# Patient Record
Sex: FEMALE | Race: BLACK OR AFRICAN AMERICAN | NOT HISPANIC OR LATINO | Employment: UNEMPLOYED | URBAN - METROPOLITAN AREA
[De-identification: names, ages, dates, MRNs, and addresses within clinical notes are randomized per-mention and may not be internally consistent; named-entity substitution may affect disease eponyms.]

---

## 2019-04-08 ENCOUNTER — HOSPITAL ENCOUNTER (EMERGENCY)
Facility: OTHER | Age: 24
Discharge: HOME | End: 2019-04-08
Attending: EMERGENCY MEDICINE

## 2019-04-08 VITALS
DIASTOLIC BLOOD PRESSURE: 71 MMHG | SYSTOLIC BLOOD PRESSURE: 108 MMHG | WEIGHT: 120 LBS | BODY MASS INDEX: 24.19 KG/M2 | HEART RATE: 75 BPM | OXYGEN SATURATION: 100 % | TEMPERATURE: 99 F | HEIGHT: 59 IN | RESPIRATION RATE: 17 BRPM

## 2019-04-08 DIAGNOSIS — N30.91 HEMORRHAGIC CYSTITIS: Primary | ICD-10-CM

## 2019-04-08 LAB
B-HCG UR QL: NEGATIVE
BACTERIA #/AREA URNS HPF: ABNORMAL /HPF
BILIRUB UR QL STRIP: NEGATIVE
CLARITY UR: ABNORMAL
COLOR UR: YELLOW
CTP QC/QA: YES
GLUCOSE UR QL STRIP: NEGATIVE
HGB UR QL STRIP: ABNORMAL
KETONES UR QL STRIP: NEGATIVE
LEUKOCYTE ESTERASE UR QL STRIP: ABNORMAL
MICROSCOPIC COMMENT: ABNORMAL
NITRITE UR QL STRIP: POSITIVE
PH UR STRIP: 7 [PH] (ref 5–8)
PROT UR QL STRIP: NEGATIVE
RBC #/AREA URNS HPF: 1 /HPF (ref 0–4)
SP GR UR STRIP: 1.02 (ref 1–1.03)
SQUAMOUS #/AREA URNS HPF: 12 /HPF
URN SPEC COLLECT METH UR: ABNORMAL
UROBILINOGEN UR STRIP-ACNC: 1 EU/DL
WBC #/AREA URNS HPF: >100 /HPF (ref 0–5)
WBC CLUMPS URNS QL MICRO: ABNORMAL

## 2019-04-08 PROCEDURE — 81000 URINALYSIS NONAUTO W/SCOPE: CPT

## 2019-04-08 PROCEDURE — 87086 URINE CULTURE/COLONY COUNT: CPT

## 2019-04-08 PROCEDURE — 81025 URINE PREGNANCY TEST: CPT | Performed by: EMERGENCY MEDICINE

## 2019-04-08 PROCEDURE — 25000003 PHARM REV CODE 250: Performed by: PHYSICIAN ASSISTANT

## 2019-04-08 PROCEDURE — 99283 EMERGENCY DEPT VISIT LOW MDM: CPT | Mod: 25

## 2019-04-08 RX ORDER — KETOROLAC TROMETHAMINE 10 MG/1
10 TABLET, FILM COATED ORAL
Status: COMPLETED | OUTPATIENT
Start: 2019-04-08 | End: 2019-04-08

## 2019-04-08 RX ORDER — CIPROFLOXACIN 500 MG/1
500 TABLET ORAL 2 TIMES DAILY
Qty: 14 TABLET | Refills: 0 | Status: SHIPPED | OUTPATIENT
Start: 2019-04-08 | End: 2019-04-15

## 2019-04-08 RX ADMIN — KETOROLAC TROMETHAMINE 10 MG: 10 TABLET, FILM COATED ORAL at 05:04

## 2019-04-08 NOTE — ED PROVIDER NOTES
Encounter Date: 4/8/2019       History     Chief Complaint   Patient presents with    Dysuria     frequency and small amounts of blood in urine.  States the last time, it was a kidney stone.  Symptoms have been present for 2 weeks.      Patient is a 23-year-old female with no pertinent past medical history presents to the ED with abdominal pain. Patient reports left lower quadrant pain that radiates to her bilateral lower back.  She denies injury. She also reports malodorous urine and frequency. She states she noticed a small amount of blood when wiping  today.  Her last menstrual cycle was 3 weeks ago.  She denies fever, nausea, or emesis.  She denies vaginal discharge. She has not taken any medications for her symptoms.    The history is provided by the patient.     Review of patient's allergies indicates:  No Known Allergies  No past medical history on file.  Past Surgical History:   Procedure Laterality Date    none       No family history on file.  Social History     Tobacco Use    Smoking status: Current Every Day Smoker   Substance Use Topics    Alcohol use: No    Drug use: Yes     Types: Marijuana     Review of Systems   Constitutional: Negative for chills and fever.   HENT: Negative for congestion and sore throat.    Eyes: Negative for pain.   Respiratory: Negative for shortness of breath.    Cardiovascular: Negative for chest pain.   Gastrointestinal: Negative for abdominal pain, diarrhea, nausea and vomiting.   Genitourinary: Positive for dysuria and hematuria.   Musculoskeletal: Negative for arthralgias.   Skin: Negative for rash.   Neurological: Negative for headaches.       Physical Exam     Initial Vitals [04/08/19 1617]   BP Pulse Resp Temp SpO2   126/77 95 17 98.8 °F (37.1 °C) 100 %      MAP       --         Physical Exam    Constitutional: Vital signs are normal. She is cooperative.  Non-toxic appearance. No distress.   HENT:   Head: Normocephalic and atraumatic.   Eyes: EOM are normal.  Pupils are equal, round, and reactive to light.   Neck: Normal range of motion. Neck supple.   Cardiovascular: Normal rate, regular rhythm and intact distal pulses.   Pulmonary/Chest: Breath sounds normal. She has no wheezes. She has no rhonchi. She has no rales.   Abdominal: Soft. Bowel sounds are normal. There is tenderness (Mild suprapubic and left lower quadrant.  No CVA tenderness.).   Musculoskeletal: Normal range of motion. She exhibits no edema.   Neurological: She is alert and oriented to person, place, and time. GCS eye subscore is 4. GCS verbal subscore is 5. GCS motor subscore is 6.   Skin: Skin is warm and dry. No rash noted.         ED Course   Procedures  Labs Reviewed   URINALYSIS, REFLEX TO URINE CULTURE - Abnormal; Notable for the following components:       Result Value    Appearance, UA Hazy (*)     Occult Blood UA Trace (*)     Nitrite, UA Positive (*)     Leukocytes, UA 2+ (*)     All other components within normal limits    Narrative:     Preferred Collection Type->Urine, Clean Catch   URINALYSIS MICROSCOPIC - Abnormal; Notable for the following components:    WBC, UA >100 (*)     WBC Clumps, UA Occasional (*)     Bacteria, UA Many (*)     All other components within normal limits    Narrative:     Preferred Collection Type->Urine, Clean Catch   CULTURE, URINE   POCT URINE PREGNANCY          Imaging Results    None          Medical Decision Making:   Initial Assessment:   Urgent evaluation of a 23 y.o. female presenting to the emergency department complaining of malodorous urine, frequency, and lower back pain. Patient is afebrile, nontoxic appearing and hemodynamically stable.  Patient appears well on exam.  She does not have true CVA tenderness.  She has mild suprapubic and left lower quadrant tenderness. No signs of acute abdomen.  Symptoms are consistent cystitis.  Will obtain urinalysis.  ED Management:  UPT is negative.  Urinalysis reveals 2+ leukocytes, positive nitrite with greater  than 100 WBCs, occasional WBC clumps and many bacteria.  Patient will be treated with Cipro.  Urine culture sent.  She was advised on symptomatic care.  She is given strict return precautions.  She has no other complaints at this time is stable for discharge.                      Clinical Impression:     1. Hemorrhagic cystitis                               David Sotelo PA-C  04/08/19 1825

## 2019-04-08 NOTE — ED NOTES
"Pt to ED reporting LLQ + pelvic pain x several days, stating "when I wiped I saw blood but only when I wiped" x several days ago as well. Also rpeoritng her urine is malodorous. Pt denies recent fever, chills, nausea or vomiting. Pt AAOx4 and appropriate at this time. Respirations even and unlabored. No acute distress noted.    "

## 2019-04-08 NOTE — ED NOTES
Appearance: Pt awake, alert & oriented to person, place & time. Pt in no acute distress at present time. Pt is clean and well groomed with clothes appropriately fastened.   Skin: Skin warm, dry & intact. Color consistent with ethnicity. Mucous membranes moist. No breakdown or brusing noted.   Musculoskeletal: Patient moving all extremities well, no obvious swelling or deformities noted.   Respiratory: Respirations spontaneous, even, and non-labored. Visible chest rise noted. Airway is open and patent. No accessory muscle use noted.   Neurologic: Sensation is intact. Speech is clear and appropriate. Eyes open spontaneously, behavior appropriate to situation, follows commands,  purposeful motor response noted.   Abdomen: No N/V/D at this time.   : SEE HPI.

## 2019-04-09 LAB
BACTERIA UR CULT: NORMAL
BACTERIA UR CULT: NORMAL

## 2022-02-24 ENCOUNTER — HOSPITAL ENCOUNTER (EMERGENCY)
Facility: OTHER | Age: 27
Discharge: HOME | End: 2022-02-24
Attending: EMERGENCY MEDICINE
Payer: MEDICAID

## 2022-02-24 VITALS
SYSTOLIC BLOOD PRESSURE: 114 MMHG | BODY MASS INDEX: 26.26 KG/M2 | RESPIRATION RATE: 18 BRPM | OXYGEN SATURATION: 100 % | HEART RATE: 100 BPM | TEMPERATURE: 98 F | WEIGHT: 130 LBS | DIASTOLIC BLOOD PRESSURE: 78 MMHG

## 2022-02-24 DIAGNOSIS — R35.0 URINARY FREQUENCY: Primary | ICD-10-CM

## 2022-02-24 DIAGNOSIS — N30.90 CYSTITIS: ICD-10-CM

## 2022-02-24 LAB
B-HCG UR QL: NEGATIVE
BACTERIA #/AREA URNS HPF: ABNORMAL /HPF
BILIRUB UR QL STRIP: NEGATIVE
CLARITY UR: ABNORMAL
COLOR UR: YELLOW
CTP QC/QA: YES
GLUCOSE UR QL STRIP: NEGATIVE
HGB UR QL STRIP: ABNORMAL
KETONES UR QL STRIP: NEGATIVE
LEUKOCYTE ESTERASE UR QL STRIP: ABNORMAL
MICROSCOPIC COMMENT: ABNORMAL
NITRITE UR QL STRIP: POSITIVE
PH UR STRIP: 6 [PH] (ref 5–8)
PROT UR QL STRIP: NEGATIVE
RBC #/AREA URNS HPF: 1 /HPF (ref 0–4)
SP GR UR STRIP: 1.02 (ref 1–1.03)
URN SPEC COLLECT METH UR: ABNORMAL
UROBILINOGEN UR STRIP-ACNC: 1 EU/DL
WBC #/AREA URNS HPF: 100 /HPF (ref 0–5)

## 2022-02-24 PROCEDURE — 87086 URINE CULTURE/COLONY COUNT: CPT | Performed by: PHYSICIAN ASSISTANT

## 2022-02-24 PROCEDURE — 87088 URINE BACTERIA CULTURE: CPT | Performed by: PHYSICIAN ASSISTANT

## 2022-02-24 PROCEDURE — 87077 CULTURE AEROBIC IDENTIFY: CPT | Performed by: PHYSICIAN ASSISTANT

## 2022-02-24 PROCEDURE — 87186 SC STD MICRODIL/AGAR DIL: CPT | Performed by: PHYSICIAN ASSISTANT

## 2022-02-24 PROCEDURE — 81000 URINALYSIS NONAUTO W/SCOPE: CPT | Performed by: PHYSICIAN ASSISTANT

## 2022-02-24 PROCEDURE — 81025 URINE PREGNANCY TEST: CPT | Performed by: PHYSICIAN ASSISTANT

## 2022-02-24 PROCEDURE — 25000003 PHARM REV CODE 250: Performed by: PHYSICIAN ASSISTANT

## 2022-02-24 PROCEDURE — 99284 EMERGENCY DEPT VISIT MOD MDM: CPT | Mod: 25

## 2022-02-24 PROCEDURE — 96372 THER/PROPH/DIAG INJ SC/IM: CPT

## 2022-02-24 PROCEDURE — 63600175 PHARM REV CODE 636 W HCPCS: Performed by: EMERGENCY MEDICINE

## 2022-02-24 RX ORDER — CEFTRIAXONE 1 G/1
1 INJECTION, POWDER, FOR SOLUTION INTRAMUSCULAR; INTRAVENOUS
Status: COMPLETED | OUTPATIENT
Start: 2022-02-24 | End: 2022-02-24

## 2022-02-24 RX ORDER — CIPROFLOXACIN 500 MG/1
500 TABLET ORAL 2 TIMES DAILY
Qty: 14 TABLET | Refills: 0 | Status: SHIPPED | OUTPATIENT
Start: 2022-02-24 | End: 2022-03-06

## 2022-02-24 RX ORDER — ACETAMINOPHEN 325 MG/1
650 TABLET ORAL
Status: COMPLETED | OUTPATIENT
Start: 2022-02-24 | End: 2022-02-24

## 2022-02-24 RX ADMIN — ACETAMINOPHEN 650 MG: 325 TABLET, FILM COATED ORAL at 08:02

## 2022-02-24 RX ADMIN — CEFTRIAXONE 1 G: 1 INJECTION, POWDER, FOR SOLUTION INTRAMUSCULAR; INTRAVENOUS at 09:02

## 2022-02-25 NOTE — ED TRIAGE NOTES
"Pt presents to the ED c/o urinary frequency x1 day. Pt reports "having to pee really often" since yesterday. Reports discomfort with urination. Reports urine has fowl odor. Reports increased vaginal discharge. Denies discoloration, burning, vaginal bleeding, chest pain, SOB, fever, weakness, or headache. AAOx4  "

## 2022-02-25 NOTE — ED PROVIDER NOTES
I, Harman Chaivra, scribed for, and in the presence of, Brionna Anderson MD. I performed the scribed service and the documentation accurately describes the services I performed. I attest to the accuracy of the note.     CHIEF COMPLAINT  Chief Complaint   Patient presents with    Urinary Frequency     Reports frequency, dysuria, abdominal discomfort, odorous urine with onset yesterday       HPI  Gaby Cardenas is a 26 y.o. female who presents with intermittent, sharp episodes of dysuria starting yesterday morning. Patient states that this has since been accompanied by vaginal discharge and urinary frequency. She denies any chills, fever, abdominal pain, nausea, vomiting, or back pain. Patient has had one sexual partner in the past year and denies having intercourse the night prior to her symptoms starting. SHx of heavy marijuana use. Patient is visiting from [place], where her mother is currently taking care of her two children. She is not on any daily medications.    This is the extent of the patient's complaints at this time.       PAST MEDICAL HISTORY  History reviewed. No pertinent past medical history.    CURRENT MEDICATIONS      Current Facility-Administered Medications:     cefTRIAXone injection 1 g, 1 g, Intramuscular, ED 1 Time, Brionna Anderson MD    Current Outpatient Medications:     ciprofloxacin HCl (CIPRO) 500 MG tablet, Take 1 tablet (500 mg total) by mouth 2 (two) times daily. for 10 days, Disp: 14 tablet, Rfl: 0    sucralfate (CARAFATE) 100 mg/mL suspension, Take 10 mLs (1 g total) by mouth 4 (four) times daily with meals and nightly. For 3 days, Disp: 414 mL, Rfl: 0    ALLERGIES    Review of patient's allergies indicates:  No Known Allergies    SURGICAL HISTORY    Past Surgical History:   Procedure Laterality Date    none         SOCIAL HISTORY    Social History     Socioeconomic History    Marital status: Single   Tobacco Use    Smoking status: Current Every Day Smoker    Smokeless  tobacco: Never Used   Substance and Sexual Activity    Alcohol use: No    Drug use: Yes     Types: Marijuana    Sexual activity: Yes     Partners: Male       FAMILY HISTORY    History reviewed. No pertinent family history.    REVIEW OF SYSTEMS  Constitutional:  No fever or weakness.   Eyes:  No redness, pain, or discharge.   HENT:  No ear pain, no sudden onset headache, no throat pain.  Respiratory:  No wheezing, cough, or shortness of breath.   Cardiovascular:  No chest pain or palpitations.  GI:  No abdominal pain, nausea, vomiting, or diarrhea.   : Positive for dysuria, frequency  Musculoskeletal:  No injury; full range of motion.   Skin:  No rash, abscess, or laceration.  Psychiatric: No suicidal ideations, homicidal ideations, auditory or visual hallucinations  Neurologic:  No focal weakness or sensory changes.   All Systems otherwise negative except as noted in the Review of Systems and History of Present Illness.    PHYSICAL EXAM    VITAL SIGNS: /78   Pulse 100   Temp 98.4 °F (36.9 °C) (Oral)   Resp 18   Wt 59 kg (130 lb)   SpO2 100%   BMI 26.26 kg/m²    Constitutional:  No acute distress.  Well developed, well nourished, alert & oriented x 3, non-toxic appearance.   HENT:  Normocephalic, atraumatic.  Normal ears, nose, and throat.  Eyes:  PERRL, EOMI, conjunctiva normal.  Neck: Normal range of motion, no tenderness, supple.  Respiratory:  Nonlabored breathing with normal breath sounds; no respiratory distress.  Cardiovascular:  RRR with no pulse deficit.  GI:  Soft, nontender, no rebound or guarding.  Musculoskeletal: Normal ROM, no tenderness, injury, edema.  Integument:  Warm, dry skin without infection or injury.  Neurologic:  Normal motor, sensation with no focal deficit.  Psychiatric:  Affect normal, Judgment normal, Mood normal. No SI, HI and not gravely disabled.    LABS  Pertinent labs reviewed. (See chart for details)   Labs Reviewed   URINALYSIS, REFLEX TO URINE CULTURE - Abnormal;  Notable for the following components:       Result Value    Appearance, UA Hazy (*)     Occult Blood UA Trace (*)     Nitrite, UA Positive (*)     Leukocytes, UA 3+ (*)     All other components within normal limits    Narrative:     Specimen Source->Urine   URINALYSIS MICROSCOPIC - Abnormal; Notable for the following components:    WBC,  (*)     Bacteria Many (*)     All other components within normal limits    Narrative:     Specimen Source->Urine   C. TRACHOMATIS/N. GONORRHOEAE BY AMP DNA   CULTURE, URINE   VAGINAL SCREEN   POCT URINE PREGNANCY         EKG    No results found for this or any previous visit.  EKG interpreted by ED MD     RADIOLOGY    No orders to display       PROCEDURES    Procedures    Medications   cefTRIAXone injection 1 g (has no administration in time range)   acetaminophen tablet 650 mg (650 mg Oral Given 22)       ED COURSE & MEDICAL DECISION MAKING      Pertinent & Imaging studies reviewed. (See chart for details)    Initial Impression: This is a 26 year old female  with dysuria, urinary frequency, and vaginal discharge. Urinalysis returning prior to my evaluation is consistent with UTI. Plan to start on antibiotics and discharge with follow-up instructions.      ED Course as of 22u  Preg Test, Ur: Negative [MG]    Occult Blood UA(!): Trace [MG]    NITRITE UA(!): Positive [MG]      ED Course User Index  [MG] Brionna Anderson MD         Discontinued Medications    No medications on file       New Prescriptions    CIPROFLOXACIN HCL (CIPRO) 500 MG TABLET    Take 1 tablet (500 mg total) by mouth 2 (two) times daily. for 10 days         OVERALL IMPRESSION:     Is a 26-year-old female presents emergency department with dysuria hematuria, nitrite positive urinary tract infection.  Patient has no evidence of pyelonephritis, awaiting results of wet prep but not concern for STIs to be treated empirically.  She was given antibiotic IM  Rocephin here and be started on Cipro    FINAL DIAGNOSIS  1. Urinary frequency    2. Cystitis        DISPOSITION  Patient discharged in stable condition.    I discussed with patient and/or family/caretaker that this evaluation in the ED does not suggest any emergent or life threatening condition medical condition requiring admission or immediate intervention beyond what was provided in the ED.  Regardless, an unremarkable evaluation in the ED does not preclude the development or presence of a serious or life threatening condition. As such, patient was instructed to return immediately for any worsening or change in current symptoms.       Scribe Attestation:   Scribe #1: I performed the above scribed service and the documentation accurately describes the services I performed. I attest to the accuracy of the note.      Brionna Anderson MD  Emergency Medicine  [facility]  2/24/2022 9:08 PM    I have reviewed the notes, assessments, and/or procedures performed by Harman back and agree with documentation of this patient    Please pardon typos or dictation errors, as this note was transcribed using M*Modal fluency direct dictation software.         Brionna Anderson MD  02/24/22 2121

## 2022-02-25 NOTE — ED NOTES
Upon entering room to discharge pt RN found that pt was not in room. Unit was searched with no sign of pt. Phone number in chart was called in attempt to contact pt to inform her she left without her antibiotic prescription with no answer

## 2022-02-25 NOTE — FIRST PROVIDER EVALUATION
Emergency Department TeleTriage Encounter Note      CHIEF COMPLAINT    Chief Complaint   Patient presents with    Urinary Frequency     Reports frequency, dysuria, abdominal discomfort, odorous urine with onset yesterday       VITAL SIGNS   Initial Vitals [02/24/22 1943]   BP Pulse Resp Temp SpO2   114/78 100 18 98.4 °F (36.9 °C) 100 %      MAP       --            ALLERGIES    Review of patient's allergies indicates:  No Known Allergies    PROVIDER TRIAGE NOTE  HPI: Gaby Cardenas, a 26 y.o. female presents to the ED for evaluation of dysuria with vaginal discharge and odor.  Concerned for STDs. No treatments tried.         ORDERS  Labs Reviewed - No data to display    ED Orders (720h ago, onward)    None            Virtual Visit Note: The provider triage portion of this emergency department evaluation and documentation was performed via [employer], a HIPAA-compliant telemedicine application, in concert with a tele-presenter in the room. A face to face patient evaluation with one of my colleagues will occur once the patient is placed in an emergency department room.      DISCLAIMER: This note was prepared with [employer] voice recognition transcription software. Garbled syntax, mangled pronouns, and other bizarre constructions may be attributed to that software system.

## 2022-02-27 LAB — BACTERIA UR CULT: ABNORMAL
